# Patient Record
Sex: FEMALE | Employment: OTHER | URBAN - METROPOLITAN AREA
[De-identification: names, ages, dates, MRNs, and addresses within clinical notes are randomized per-mention and may not be internally consistent; named-entity substitution may affect disease eponyms.]

---

## 2020-02-04 NOTE — PATIENT DISCUSSION
Cataract surgery has been performed in the first eye and activities of daily living are still impaired. The patient would like to proceed with cataract surgery in the second eye as scheduled. The patient elects ADV TMF OS, goal of Mirna.

## 2020-02-10 NOTE — PATIENT DISCUSSION
Cataract surgery has been performed in the first eye and activities of daily living are still impaired. The patient would like to proceed with cataract surgery in the second eye as scheduled. The patient elects TMF +3.25 OS, goal of Emmetropia.

## 2020-02-10 NOTE — PATIENT DISCUSSION
Instructed to call immediately if any new distortion, blurring, decreased vision or eye pain. Seizure

## 2020-02-18 NOTE — PATIENT DISCUSSION
2 weeks PO: Patient is doing well post-operatively. The importance of post-op drop compliance was emphasized. Drop scheduled reviewed with patient. Patient to call if any visual changes or concerns.

## 2021-03-15 ENCOUNTER — CONSULTATION (OUTPATIENT)
Dept: URBAN - METROPOLITAN AREA CLINIC 11 | Facility: CLINIC | Age: 86
End: 2021-03-15

## 2021-03-15 ASSESSMENT — VISUAL ACUITY
OD_SC: 20/100
OS_SC: 20/40
OS_CC: 20/40
OD_CC: 20/100

## 2021-03-15 NOTE — PATIENT DISCUSSION
Patient understands condition, prognosis and need for follow up care. Over 45 minutes was spent in conversation with the patient and her daughter. I explained to them that the patient has dry macular degeneration and her condition seems to be stable in both eyes. I would like to see Mrs. Alma Hicks again in 4 months for follow up evaluation.

## 2022-09-21 NOTE — PATIENT DISCUSSION
Artificial Tears: One drop to both eyes PRN. We recommend Systane or Refresh lubricating eye drops which can be found at any pharmacy.

## 2023-08-22 NOTE — PATIENT DISCUSSION
Recommended Observation. No Repair - Repaired With Adjacent Surgical Defect Text (Leave Blank If You Do Not Want): After obtaining clear surgical margins the defect was repaired concurrently with another surgical defect which was in close approximation.